# Patient Record
Sex: MALE | Race: BLACK OR AFRICAN AMERICAN | Employment: UNEMPLOYED | URBAN - METROPOLITAN AREA
[De-identification: names, ages, dates, MRNs, and addresses within clinical notes are randomized per-mention and may not be internally consistent; named-entity substitution may affect disease eponyms.]

---

## 2017-01-30 ENCOUNTER — APPOINTMENT (OUTPATIENT)
Dept: GENERAL RADIOLOGY | Facility: HOSPITAL | Age: 37
End: 2017-01-30
Attending: EMERGENCY MEDICINE
Payer: MEDICARE

## 2017-01-30 ENCOUNTER — HOSPITAL ENCOUNTER (EMERGENCY)
Facility: HOSPITAL | Age: 37
Discharge: HOME | End: 2017-01-30
Attending: EMERGENCY MEDICINE
Payer: MEDICARE

## 2017-01-30 VITALS
HEART RATE: 89 BPM | HEIGHT: 73 IN | TEMPERATURE: 98 F | WEIGHT: 280 LBS | SYSTOLIC BLOOD PRESSURE: 162 MMHG | BODY MASS INDEX: 37.11 KG/M2 | DIASTOLIC BLOOD PRESSURE: 81 MMHG | OXYGEN SATURATION: 95 % | RESPIRATION RATE: 22 BRPM

## 2017-01-30 DIAGNOSIS — R05.3 CHRONIC COUGH: ICD-10-CM

## 2017-01-30 DIAGNOSIS — J06.9 UPPER RESPIRATORY TRACT INFECTION, UNSPECIFIED TYPE: Primary | ICD-10-CM

## 2017-01-30 PROCEDURE — 99283 EMERGENCY DEPT VISIT LOW MDM: CPT

## 2017-01-30 PROCEDURE — 71020 XR CHEST PA + LAT CHEST (CPT=71020): CPT

## 2017-01-30 RX ORDER — FLUTICASONE PROPIONATE 50 MCG
1 SPRAY, SUSPENSION (ML) NASAL 2 TIMES DAILY
Qty: 16 G | Refills: 0 | Status: SHIPPED | OUTPATIENT
Start: 2017-01-30 | End: 2017-02-14

## 2017-01-31 NOTE — ED PROVIDER NOTES
Patient Seen in: [facility] AND [facility] Emergency Department    History   Patient presents with:  Cough/URI    Stated Complaint: cold symptoms    HPI    The patient is a 26-year-old male who presents to the emergency department with 1 week of worsening cough ear normal.   Left Ear: Tympanic membrane and external ear normal.   Nose: Mucosal edema and rhinorrhea present. Right sinus exhibits maxillary sinus tenderness. Right sinus exhibits no frontal sinus tenderness.  Left sinus exhibits maxillary sinus tenderne encounter diagnosis)  Chronic cough    Disposition:  Discharge    Follow-up:  Your doctor    Schedule an appointment as soon as possible for a visit in 1 week      Safia Haywood DO  [address] [facility],[address] [place] [address].

## 2025-01-16 ENCOUNTER — APPOINTMENT (OUTPATIENT)
Dept: GENERAL RADIOLOGY | Facility: HOSPITAL | Age: 45
End: 2025-01-16
Attending: EMERGENCY MEDICINE
Payer: MEDICARE

## 2025-01-16 ENCOUNTER — HOSPITAL ENCOUNTER (INPATIENT)
Facility: HOSPITAL | Age: 45
LOS: 1 days | Discharge: LEFT BEFORE DISCHARGE | End: 2025-01-17
Attending: EMERGENCY MEDICINE | Admitting: HOSPITALIST
Payer: MEDICARE

## 2025-01-16 ENCOUNTER — APPOINTMENT (OUTPATIENT)
Dept: CT IMAGING | Facility: HOSPITAL | Age: 45
End: 2025-01-16
Attending: EMERGENCY MEDICINE
Payer: MEDICARE

## 2025-01-16 DIAGNOSIS — J96.01 ACUTE RESPIRATORY FAILURE WITH HYPOXIA (HCC): Primary | ICD-10-CM

## 2025-01-16 DIAGNOSIS — I10 ACCELERATED HYPERTENSION: ICD-10-CM

## 2025-01-16 DIAGNOSIS — R79.89 ELEVATED TROPONIN: ICD-10-CM

## 2025-01-16 PROBLEM — J20.9 ACUTE BRONCHITIS: Status: ACTIVE | Noted: 2025-01-16

## 2025-01-16 LAB
ANION GAP SERPL CALC-SCNC: 6 MMOL/L (ref 0–18)
ATRIAL RATE: 102 BPM
BASOPHILS # BLD AUTO: 0.02 X10(3) UL (ref 0–0.2)
BASOPHILS NFR BLD AUTO: 0.3 %
BUN BLD-MCNC: 10 MG/DL (ref 9–23)
BUN/CREAT SERPL: 9.4 (ref 10–20)
CALCIUM BLD-MCNC: 9.2 MG/DL (ref 8.7–10.4)
CHLORIDE SERPL-SCNC: 105 MMOL/L (ref 98–112)
CHOLEST SERPL-MCNC: 167 MG/DL (ref ?–200)
CO2 SERPL-SCNC: 31 MMOL/L (ref 21–32)
CREAT BLD-MCNC: 1.06 MG/DL
D DIMER PPP FEU-MCNC: 1.25 UG/ML FEU (ref ?–0.5)
DEPRECATED RDW RBC AUTO: 42.6 FL (ref 35.1–46.3)
EGFRCR SERPLBLD CKD-EPI 2021: 88 ML/MIN/1.73M2 (ref 60–?)
EOSINOPHIL # BLD AUTO: 0.04 X10(3) UL (ref 0–0.7)
EOSINOPHIL NFR BLD AUTO: 0.7 %
ERYTHROCYTE [DISTWIDTH] IN BLOOD BY AUTOMATED COUNT: 13.2 % (ref 11–15)
EST. AVERAGE GLUCOSE BLD GHB EST-MCNC: 278 MG/DL (ref 68–126)
FLUAV + FLUBV RNA SPEC NAA+PROBE: NEGATIVE
FLUAV + FLUBV RNA SPEC NAA+PROBE: POSITIVE
GLUCOSE BLD-MCNC: 251 MG/DL (ref 70–99)
GLUCOSE BLDC GLUCOMTR-MCNC: 298 MG/DL (ref 70–99)
GLUCOSE BLDC GLUCOMTR-MCNC: 451 MG/DL (ref 70–99)
HBA1C MFR BLD: 11.3 % (ref ?–5.7)
HCT VFR BLD AUTO: 45.3 %
HDLC SERPL-MCNC: 23 MG/DL (ref 40–59)
HGB BLD-MCNC: 14.9 G/DL
IMM GRANULOCYTES # BLD AUTO: 0.04 X10(3) UL (ref 0–1)
IMM GRANULOCYTES NFR BLD: 0.7 %
LDLC SERPL CALC-MCNC: 114 MG/DL (ref ?–100)
LYMPHOCYTES # BLD AUTO: 2.06 X10(3) UL (ref 1–4)
LYMPHOCYTES NFR BLD AUTO: 33.6 %
MCH RBC QN AUTO: 29 PG (ref 26–34)
MCHC RBC AUTO-ENTMCNC: 32.9 G/DL (ref 31–37)
MCV RBC AUTO: 88.1 FL
MONOCYTES # BLD AUTO: 0.54 X10(3) UL (ref 0.1–1)
MONOCYTES NFR BLD AUTO: 8.8 %
NEUTROPHILS # BLD AUTO: 3.44 X10 (3) UL (ref 1.5–7.7)
NEUTROPHILS # BLD AUTO: 3.44 X10(3) UL (ref 1.5–7.7)
NEUTROPHILS NFR BLD AUTO: 55.9 %
NONHDLC SERPL-MCNC: 144 MG/DL (ref ?–130)
NT-PROBNP SERPL-MCNC: 427 PG/ML (ref ?–125)
OSMOLALITY SERPL CALC.SUM OF ELEC: 302 MOSM/KG (ref 275–295)
P AXIS: 59 DEGREES
P-R INTERVAL: 168 MS
PLATELET # BLD AUTO: 190 10(3)UL (ref 150–450)
POTASSIUM SERPL-SCNC: 4.3 MMOL/L (ref 3.5–5.1)
Q-T INTERVAL: 364 MS
QRS DURATION: 92 MS
QTC CALCULATION (BEZET): 474 MS
R AXIS: 33 DEGREES
RBC # BLD AUTO: 5.14 X10(6)UL
RSV RNA SPEC NAA+PROBE: NEGATIVE
SARS-COV-2 RNA RESP QL NAA+PROBE: NOT DETECTED
SODIUM SERPL-SCNC: 142 MMOL/L (ref 136–145)
T AXIS: 52 DEGREES
TRIGL SERPL-MCNC: 170 MG/DL (ref 30–149)
TROPONIN I SERPL HS-MCNC: 72 NG/L
TROPONIN I SERPL HS-MCNC: 76 NG/L
VENTRICULAR RATE: 102 BPM
VLDLC SERPL CALC-MCNC: 30 MG/DL (ref 0–30)
WBC # BLD AUTO: 6.1 X10(3) UL (ref 4–11)

## 2025-01-16 PROCEDURE — 71045 X-RAY EXAM CHEST 1 VIEW: CPT | Performed by: EMERGENCY MEDICINE

## 2025-01-16 PROCEDURE — 5A0945A ASSISTANCE WITH RESPIRATORY VENTILATION, 24-96 CONSECUTIVE HOURS, HIGH NASAL FLOW/VELOCITY: ICD-10-PCS | Performed by: FAMILY MEDICINE

## 2025-01-16 PROCEDURE — 99223 1ST HOSP IP/OBS HIGH 75: CPT | Performed by: HOSPITALIST

## 2025-01-16 PROCEDURE — 71260 CT THORAX DX C+: CPT | Performed by: EMERGENCY MEDICINE

## 2025-01-16 PROCEDURE — 99223 1ST HOSP IP/OBS HIGH 75: CPT | Performed by: INTERNAL MEDICINE

## 2025-01-16 RX ORDER — INSULIN DEGLUDEC 100 U/ML
15 INJECTION, SOLUTION SUBCUTANEOUS NIGHTLY
Status: DISCONTINUED | OUTPATIENT
Start: 2025-01-16 | End: 2025-01-17

## 2025-01-16 RX ORDER — METHYLPREDNISOLONE SODIUM SUCCINATE 125 MG/2ML
125 INJECTION INTRAMUSCULAR; INTRAVENOUS ONCE
Status: COMPLETED | OUTPATIENT
Start: 2025-01-16 | End: 2025-01-16

## 2025-01-16 RX ORDER — METOPROLOL TARTRATE 1 MG/ML
2.5 INJECTION, SOLUTION INTRAVENOUS ONCE
Status: COMPLETED | OUTPATIENT
Start: 2025-01-16 | End: 2025-01-16

## 2025-01-16 RX ORDER — HYDRALAZINE HYDROCHLORIDE 20 MG/ML
10 INJECTION INTRAMUSCULAR; INTRAVENOUS ONCE
Status: COMPLETED | OUTPATIENT
Start: 2025-01-16 | End: 2025-01-16

## 2025-01-16 RX ORDER — IPRATROPIUM BROMIDE AND ALBUTEROL SULFATE 2.5; .5 MG/3ML; MG/3ML
3 SOLUTION RESPIRATORY (INHALATION)
Status: DISCONTINUED | OUTPATIENT
Start: 2025-01-16 | End: 2025-01-17

## 2025-01-16 RX ORDER — OSELTAMIVIR PHOSPHATE 75 MG/1
75 CAPSULE ORAL 2 TIMES DAILY
Status: DISCONTINUED | OUTPATIENT
Start: 2025-01-16 | End: 2025-01-17

## 2025-01-16 RX ORDER — DILTIAZEM HCL 60 MG
60 TABLET ORAL EVERY 6 HOURS SCHEDULED
Status: DISCONTINUED | OUTPATIENT
Start: 2025-01-16 | End: 2025-01-17

## 2025-01-16 RX ORDER — PROCHLORPERAZINE EDISYLATE 5 MG/ML
5 INJECTION INTRAMUSCULAR; INTRAVENOUS EVERY 8 HOURS PRN
Status: DISCONTINUED | OUTPATIENT
Start: 2025-01-16 | End: 2025-01-17

## 2025-01-16 RX ORDER — ALBUTEROL SULFATE 0.83 MG/ML
2.5 SOLUTION RESPIRATORY (INHALATION) ONCE
Status: COMPLETED | OUTPATIENT
Start: 2025-01-16 | End: 2025-01-16

## 2025-01-16 RX ORDER — TEMAZEPAM 15 MG/1
15 CAPSULE ORAL NIGHTLY PRN
Status: DISCONTINUED | OUTPATIENT
Start: 2025-01-16 | End: 2025-01-17

## 2025-01-16 RX ORDER — ACETAMINOPHEN 500 MG
500 TABLET ORAL EVERY 4 HOURS PRN
Status: DISCONTINUED | OUTPATIENT
Start: 2025-01-16 | End: 2025-01-17

## 2025-01-16 RX ORDER — IPRATROPIUM BROMIDE AND ALBUTEROL SULFATE 2.5; .5 MG/3ML; MG/3ML
3 SOLUTION RESPIRATORY (INHALATION) ONCE
Status: COMPLETED | OUTPATIENT
Start: 2025-01-16 | End: 2025-01-16

## 2025-01-16 RX ORDER — NICOTINE POLACRILEX 4 MG
15 LOZENGE BUCCAL
Status: DISCONTINUED | OUTPATIENT
Start: 2025-01-16 | End: 2025-01-17

## 2025-01-16 RX ORDER — ASPIRIN 325 MG
325 TABLET ORAL DAILY
Status: DISCONTINUED | OUTPATIENT
Start: 2025-01-16 | End: 2025-01-17

## 2025-01-16 RX ORDER — METHYLPREDNISOLONE SODIUM SUCCINATE 40 MG/ML
40 INJECTION INTRAMUSCULAR; INTRAVENOUS EVERY 12 HOURS
Status: DISCONTINUED | OUTPATIENT
Start: 2025-01-16 | End: 2025-01-17

## 2025-01-16 RX ORDER — NICOTINE POLACRILEX 4 MG
30 LOZENGE BUCCAL
Status: DISCONTINUED | OUTPATIENT
Start: 2025-01-16 | End: 2025-01-17

## 2025-01-16 RX ORDER — HYDRALAZINE HYDROCHLORIDE 20 MG/ML
20 INJECTION INTRAMUSCULAR; INTRAVENOUS EVERY 4 HOURS PRN
Status: DISCONTINUED | OUTPATIENT
Start: 2025-01-16 | End: 2025-01-17

## 2025-01-16 RX ORDER — NITROGLYCERIN 0.4 MG/1
0.4 TABLET SUBLINGUAL EVERY 5 MIN PRN
Status: DISCONTINUED | OUTPATIENT
Start: 2025-01-16 | End: 2025-01-17

## 2025-01-16 RX ORDER — BENZONATATE 100 MG/1
200 CAPSULE ORAL 3 TIMES DAILY PRN
Status: DISCONTINUED | OUTPATIENT
Start: 2025-01-16 | End: 2025-01-17

## 2025-01-16 RX ORDER — HYDRALAZINE HYDROCHLORIDE 20 MG/ML
10 INJECTION INTRAMUSCULAR; INTRAVENOUS
Status: DISCONTINUED | OUTPATIENT
Start: 2025-01-16 | End: 2025-01-16

## 2025-01-16 RX ORDER — FLUTICASONE PROPIONATE AND SALMETEROL 250; 50 UG/1; UG/1
1 POWDER RESPIRATORY (INHALATION) 2 TIMES DAILY
Status: DISCONTINUED | OUTPATIENT
Start: 2025-01-16 | End: 2025-01-17

## 2025-01-16 RX ORDER — IPRATROPIUM BROMIDE AND ALBUTEROL SULFATE 2.5; .5 MG/3ML; MG/3ML
3 SOLUTION RESPIRATORY (INHALATION) EVERY 4 HOURS PRN
Status: DISCONTINUED | OUTPATIENT
Start: 2025-01-16 | End: 2025-01-17

## 2025-01-16 RX ORDER — AMLODIPINE BESYLATE 10 MG/1
10 TABLET ORAL DAILY
Status: DISCONTINUED | OUTPATIENT
Start: 2025-01-16 | End: 2025-01-16

## 2025-01-16 RX ORDER — DEXTROSE MONOHYDRATE 25 G/50ML
50 INJECTION, SOLUTION INTRAVENOUS
Status: DISCONTINUED | OUTPATIENT
Start: 2025-01-16 | End: 2025-01-17

## 2025-01-16 RX ORDER — ENOXAPARIN SODIUM 100 MG/ML
40 INJECTION SUBCUTANEOUS DAILY
Status: DISCONTINUED | OUTPATIENT
Start: 2025-01-16 | End: 2025-01-17

## 2025-01-16 RX ORDER — ONDANSETRON 2 MG/ML
4 INJECTION INTRAMUSCULAR; INTRAVENOUS EVERY 6 HOURS PRN
Status: DISCONTINUED | OUTPATIENT
Start: 2025-01-16 | End: 2025-01-17

## 2025-01-16 NOTE — ED QUICK NOTES
Orders for admission, patient is aware of plan and ready to go upstairs. Any questions, please call ED RN bianca at extension [phone].     Patient Covid vaccination status: Unvaccinated     COVID Test Ordered in ED: SARS-CoV-2/Flu A and B/RSV by PCR (GeneXpert)    COVID Suspicion at Admission: N/A    Running Infusions:  None    Mental Status/LOC at time of transport: x4    Other pertinent information:   CIWA score: N/A   NIH score:  N/A

## 2025-01-16 NOTE — CONSULTS
Initial Pulmonary Medicine Inpatient Consultation           Consult requested by Dr. Elizondo for shortness of breath.        HPI: 43 yo male with hx of HTN (used to take meds in the past but not recently), previously smoked weed , admitted with shortness of breath, coughing, wheezing x 1 week. No n/v/d.  Possible sick contacts  In the ED found to be hypoxemic  Received neb tx, steroids  D dimer + so had a chest CT PE done showed pulm edema pattern  Tested influenza A +  proBNP 400    Afebrile, on 3 LNC. Feels a little better       REVIEW OF SYSTEMS:  Positives and negatives as stated in HPI. Remainder of 12 pt review of systems otherwise are negative.       PAST MEDICAL HISTORY:  Past Medical History:   Diagnosis Date    Unspecified essential hypertension         PAST SURGICAL HISTORY:  History reviewed. No pertinent surgical history.     PAST FAMILY HISTORY:  No family history on file.     PAST SOCIAL HISTORY:  Social History     Socioeconomic History    Marital status: Single   Tobacco Use    Smoking status: Never   Substance and Sexual Activity    Alcohol use: No     Alcohol/week: 0.0 standard drinks of alcohol    Drug use: No    Sexual activity: Yes     Partners: Female   States he quit smoking 2 weeks ago. Before that has been smoking since age 16 @ 4-5 bag weed  Doesn't smoke cig  Lives with room mate  No pets  Employed: security        ALLERGIES:  Allergies[1]     MEDS:  Home Medications:  Medications Taking[2]  Scheduled Medication:    Continuous Infusing Medication:    PRN Medications:         PHYSICAL EXAM:  /85   Pulse 90   Temp 97.8 °F (36.6 °C) (Temporal)   Resp 24   SpO2 90%   CONSTITUTIONAL: alert, oriented, no apparent distress  HEENT: atraumatic normocephalic  MOUTH: mucous membranes are moist. No OP exudates  NECK/THROAT: no JVD. Trachea midline. No obvious thyromegaly  LUNG: clear b/l no wheezing, crackles. Chest symmetric with respiratory motion  HEART: regular rate and rhythm, no  obvious murmers or gallops note  ABD: soft non tender. + bowel sounds. No organomegaly noted  EXT: no clubbing, cyanosis, or edema noted. Pulses intact grossly  NEURO/MUSCULOSKELETAL: no gross deficits  SKIN: warm, dry. No obvious lesions noted  LYMPH: no obvious LAD       IMAGES:  Chest CT PE my read no PE, b/l ground glass opacities concerning for pulm edema  Official read:  No pulmonary embolus   Diffuse septal thickening with multiple ill-defined areas of ground-glass within the bilateral upper and lower lobe suggestive of moderate pulmonary edema.  Superimposed multifocal pneumonia felt less likely.   Hepatic steatosis       LABS:  Recent Labs   Lab 01/16/25  1357   RBC 5.14   HGB 14.9   HCT 45.3   MCV 88.1   MCH 29.0   MCHC 32.9   RDW 13.2   NEPRELIM 3.44   WBC 6.1   .0       Recent Labs   Lab 01/16/25  1357   *   BUN 10   CREATSERUM 1.06   EGFRCR 88   CA 9.2      K 4.3      CO2 31.0          ASSESSMENT/PLAN:  Acute hypoxemic respiratory failure  -likely d/t influenza A viral infection/bronchitis  -start tamiflu course  -start duonebs q 6 hrs and  advair 9250/50) bid  -can start short course of abx  -hold off on further steroids since pt is not wheezing now    HTN  -not on meds any longer  -defer to primary team to start meds  -may benefit from diuresis with chest CT showing pulm edema and labs showing elevated proBNP    Smokes weed  -stopped 2 weeks ago    Snoring and elevated BMI  -probably has AMANDEEP  -will need outpt PSG    Proph:  -DVT: hep or lovenox-defer to primary team       Thank you for the opportunity to care for Renny Shellie Banegas DO, MPH  Pulmonary Critical Care Medicine  [facility]          [1] No Known Allergies  [2]   No outpatient medications have been marked as taking for the 1/16/25 encounter (Hospital Encounter).

## 2025-01-16 NOTE — ED INITIAL ASSESSMENT (HPI)
Pt to ED via [facility] with shortness of breath with exertion x1 week.  Per EMS, IC reported that the pt was 85% on RA before albuterol treatment.   Denies any chest pain, cough, or recent fevers.   Pt O2 sat at 86% on RA upon arrival to ER.

## 2025-01-16 NOTE — H&P
[facility]    PATIENT'S NAME: JOSE NAVAS   ATTENDING PHYSICIAN: Christiano Elizondo MD   PATIENT ACCOUNT#:   [id number]    LOCATION:  [address]  MEDICAL RECORD #:   [id number]       YOB: 1980  ADMISSION DATE:       01/16/2025    HISTORY AND PHYSICAL EXAMINATION    CHIEF COMPLAINT:  Acute bronchitis, hypoxia, elevated troponins.     HISTORY OF PRESENT ILLNESS:  Patient is a 44-year-old  male who came into the emergency department for evaluation today for progressive wheezing, shortness of breath, fatigue, and dyspnea on exertion.  CBC and chemistry were unremarkable except for glucose 251.  Blood pressure was elevated initially at 166/111.  Troponin 72.  EKG showed left ventricular hypertrophy changes.  Patient also noted to be hypoxic, 86% on room air, requiring oxygen, and wheezing.  D-dimer was slightly elevated t this time 1.25 and proBNP 427.  Chest x-ray showed vague bibasilar opacities.  CT scan of the chest to rule out PE is still pending.  GeneXpert viral panel is still pending.    PAST MEDICAL HISTORY:  Morbid obesity, possible undiagnosed obstructive sleep apnea, and possible undiagnosed diabetes mellitus type 2.     PAST SURGICAL HISTORY:  None.    MEDICATIONS:  Patient does not take medications at home.     ALLERGIES:  No known drug allergies.    FAMILY HISTORY:  Positive for diabetes mellitus type 2 and hypertension.    SOCIAL HISTORY:  He quit tobacco around 2 weeks ago, used to be a heavy smoker.  Occasional alcohol.  No drug use.  Lives by himself.  Usually independent in his basic activities of daily living.    REVIEW OF SYSTEMS:  Patient describes wheezing and shortness of breath, started with cough around a week ago, now he is very fatigued and tired and dyspneic, even without exertion.  No chest pain.  Other 12-point review of systems is negative.        PHYSICAL EXAMINATION:    GENERAL:  Alert, oriented to time, place, and person.  Visibly  dyspneic.   VITAL SIGNS:  Temperature 97.8, pulse 99, respiratory rate 24, blood pressure 160/79 after IV hydralazine, pulse ox 86% on room air, 97% on 3L nasal cannula oxygen.   HEENT:  Atraumatic.  Oropharynx clear.  Moist mucous membranes.  Ears, nose normal.  Eyes:  Anicteric sclerae.    NECK:  Supple.  No lymphadenopathy.  Trachea midline.  Full range of motion.    LUNGS:  Bilateral expiratory wheezing both lung fields.   HEART:  Regular rate and rhythm.  S1, S2 auscultated.    ABDOMEN:  Soft, nondistended.  Obese.  Positive bowel sounds.    EXTREMITIES:  +1 edema of both legs.  No clubbing or cyanosis.    NEUROLOGIC:  Motor and sensory intact.    ASSESSMENT:    1.   Acute bronchitis, most likely viral, with hypoxemic respiratory failure.   2.   Morbid obesity, possibly undiagnosed severe overlap syndrome and obstructive sleep apnea.  3.   Elevated troponin, possible demand ischemia, with underlying left ventricular hypertrophy with hypertensive cardiac disease.  4.   Uncontrolled hypertension.  5.   Hyperglycemia, possible undiagnosed diabetes mellitus type 2.    PLAN:  Patient will be admitted to telemetry floor.  Continue to trend troponin level.  Obtain 2D echocardiogram with Doppler.  Start him on Norvasc 10 mg daily.  IV hydralazine p.r.n.  Monitor Accu-Cheks and start on long- and short-acting insulin with IV Solu-Medrol and nebulizer treatments.  Oxygen support.  Pulmonary and cardiology consults.  Further recommendations to follow.    Dictated By Nia Matthews MD  d: 01/16/2025 15:18:29  t: 01/16/2025 16:42:35  Job [id number]  FB/

## 2025-01-16 NOTE — ED PROVIDER NOTES
Patient Seen in: [facility] Emergency Department      History     Chief Complaint   Patient presents with    Shortness Of Breath     Stated Complaint:     Subjective:   HPI      43 y/o male from  with difficulty breathing over the past week.  Hypoxic at immediate care.  Did get a little improvement with nebs.  No history of asthma or heart history.  No recent travel or antibiotics.  Does not have a doctor currently.  No pain.    Objective:     Past Medical History:    Unspecified essential hypertension              History reviewed. No pertinent surgical history.             Social History     Socioeconomic History    Marital status: Single   Tobacco Use    Smoking status: Never   Substance and Sexual Activity    Alcohol use: No     Alcohol/week: 0.0 standard drinks of alcohol    Drug use: No    Sexual activity: Yes     Partners: Female                  Physical Exam     ED Triage Vitals   BP 01/16/25 1306 (!) 166/111   Pulse 01/16/25 1306 104   Resp 01/16/25 1306 22   Temp 01/16/25 1307 97.8 °F (36.6 °C)   Temp src 01/16/25 1307 Temporal   SpO2 01/16/25 1306 (!) 86 %   O2 Device 01/16/25 1306 None (Room air)       Current Vitals:   Vital Signs  BP: 150/85  Pulse: 90  Resp: 24  Temp: 97.8 °F (36.6 °C)  Temp src: Temporal  MAP (mmHg): 99    Oxygen Therapy  SpO2: 90 %  O2 Device: Nasal cannula  O2 Flow Rate (L/min): 3 L/min        Physical Exam  Constitutional: Oriented to person, place, and time.  Appears well-developed. No distress.  Obese  Head: Normocephalic and atraumatic.   Eyes: Conjunctivae are normal. Pupils are equal, round, and reactive to light.   Neck: Normal range of motion. Neck supple.  No stridor or JVD  Cardiovascular: Normal rate, regular rhythm and intact distal pulses.    Pulmonary/Chest: No distress, diminished lung sounds with faint wheeze bilaterally, no crackles  Abdominal: Soft. There is no tenderness. There is no guarding.   Musculoskeletal: Normal range of motion. No edema or  tenderness.   Neurological: Alert and oriented to person, place, and time.   Skin: Skin is warm and dry.   Psychiatric: Normal mood and affect.  Behavior is normal.   Nursing note and vitals reviewed.    Differential diagnosis includes acute viral respiratory syndrome, viral bronchitis, pneumonia, accelerated hypertension, PE.      ED Course     Labs Reviewed   TROPONIN I HIGH SENSITIVITY - Abnormal; Notable for the following components:       Result Value    Troponin I (High Sensitivity) 72 (*)     All other components within normal limits   BASIC METABOLIC PANEL (8) - Abnormal; Notable for the following components:    Glucose 251 (*)     BUN/CREA Ratio 9.4 (*)     Calculated Osmolality 302 (*)     All other components within normal limits   D-DIMER - Abnormal; Notable for the following components:    D-Dimer 1.25 (*)     All other components within normal limits   LIPID PANEL - Abnormal; Notable for the following components:    HDL Cholesterol 23 (*)     Triglycerides 170 (*)     LDL Cholesterol 114 (*)     Non HDL Chol 144 (*)     All other components within normal limits   PRO BETA NATRIURETIC PEPTIDE - Abnormal; Notable for the following components:    Pro-Beta Natriuretic Peptide 427 (*)     All other components within normal limits   TROPONIN I HIGH SENSITIVITY - Abnormal; Notable for the following components:    Troponin I (High Sensitivity) 76 (*)     All other components within normal limits   CBC WITH DIFFERENTIAL WITH PLATELET   RAINBOW DRAW LAVENDER   RAINBOW DRAW LIGHT GREEN   RAINBOW DRAW BLUE   SARS-COV-2/FLU A AND B/RSV BY PCR (GENEXPERT)     EKG    Rate, intervals and axes as noted on EKG Report.  Rate: 102  Rhythm: Sinus Rhythm  Reading: No acute ischemic changes                CT CHEST PE AORTA (IV ONLY) (CPT=71260)    Result Date: 1/16/2025  PROCEDURE: CT CHEST PE AORTA (IV ONLY) (CPT=71260)  COMPARISON: [facility], XR CHEST AP PORTABLE (CPT=71045), 1/16/2025, 2:26 PM.  [place]  [facility], CT CHEST PAIN/PE W CONTRAST, 9/12/2015, 3:14 PM.  INDICATIONS: Hypoxia, tachycardia, and elevated d-dimer.  TECHNIQUE: CT images of the chest were obtained with non-ionic intravenous contrast material.  Automated exposure control for dose reduction was used. Adjustment of the mA and/or kV was done based on the patient's size. Use of iterative reconstruction technique for dose reduction was used. Dose information is transmitted to the ACR (American College of Radiology) NRDR (National Radiology Data Registry) which includes the Dose Index Registry.  FINDINGS:  PULMONARY ARTERIES:   No pulmonary embolus   LINES AND TUBES: None.  MEDIASTINUM/VASCULATURE: No axillary, hilar, mediastinal lymphadenopathy. The thoracic aorta is unremarkable and not dilated. Mediastinal fat planes are preserved. Cardiac chambers are unremarkable. Pericardium is normal. The main pulmonary artery has a normal diameter and is otherwise unremarkable.  LUNGS AND PLEURA: Multiple patchy areas of ground-glass seen scattered throughout the bilateral upper and lower lobes.  Mild interlobular septal thickening within the bilateral upper and lower lobes.  No pneumothorax or pleural effusion.  CHEST WALL/BONES: There is degenerative disease of the thoracic spine. The chest wall and osseous structures are otherwise unremarkable.  LIMITED ABDOMEN: Small hiatal hernia with wall thickening at the gastroesophageal junction. Diffuse low attenuation seen throughout the liver compatible with fatty infiltration.         CONCLUSION:   No pulmonary embolus  Diffuse septal thickening with multiple ill-defined areas of ground-glass within the bilateral upper and lower lobe suggestive of moderate pulmonary edema.  Superimposed multifocal pneumonia felt less likely.  Hepatic steatosis    Dictated by (CST): Teo Elizabeth MD on 1/16/2025 at 4:25 PM     Finalized by (CST): Teo Elizabeth MD on 1/16/2025 at 4:30 PM          XR CHEST AP PORTABLE   (CPT=71045)    Result Date: 1/16/2025  PROCEDURE: XR CHEST AP PORTABLE  (CPT=71045) TIME: 1426  COMPARISON: None.  INDICATIONS: Shortness of breath and cough x1 week.  TECHNIQUE:   Single view.   Findings and impression:  Heart and vascularity are magnified by portable technique and body habitus  Normal lung volumes.  No consolidation  There are mild vague bibasilar opacities, nonspecific  No pneumothorax Finalized by (CST): Ant Verde MD on 1/16/2025 at 2:49 PM                [facility]          Admission disposition: 1/16/2025  3:47 PM           Medical Decision Making  Patient is stable.  Heart rate and blood pressure little improved.  Does have short runs of PSVT.  I did give him a dose of Lopressor.  He got some nebs and steroids.  Troponin leak.  Cardiology and pulmonary to see.  Hospitalist will admit.    Problems Addressed:  Accelerated hypertension: chronic illness or injury with exacerbation, progression, or side effects of treatment  Acute respiratory failure with hypoxia (HCC): acute illness or injury with systemic symptoms that poses a threat to life or bodily functions  Elevated troponin: undiagnosed new problem with uncertain prognosis    Amount and/or Complexity of Data Reviewed  Labs: ordered. Decision-making details documented in ED Course.  Radiology: ordered and independent interpretation performed. Decision-making details documented in ED Course.     Details: By my review there is no obvious evidence of pulmonary edema, pleural effusion, pneumothorax or focal infiltrate on x-ray imaging.  ECG/medicine tests: ordered and independent interpretation performed. Decision-making details documented in ED Course.    Risk  Decision regarding hospitalization.        Disposition and Plan     Clinical Impression:  1. Acute respiratory failure with hypoxia (HCC)    2. Accelerated hypertension    3. Elevated troponin         Disposition:  Admit  1/16/2025  3:47 pm    Follow-up:  No follow-up provider  specified.        Medications Prescribed:  There are no discharge medications for this patient.          Supplementary Documentation:         Hospital Problems       Present on Admission  Date Reviewed: 9/21/2015            ICD-10-CM Noted POA    * (Principal) Acute respiratory failure with hypoxia (HCC) J96.01 1/16/2025 Unknown

## 2025-01-16 NOTE — CONSULTS
[place] [facility]  Report of Consultation    Renny Freitas Patient Status:  Emergency    1980 MRN [id number]   Location [facility] EMERGENCY DEPARTMENT Attending Christiano Elizondo MD   Hosp Day # 0 PCP None Pcp     Reason for Consultation:  Supraventricular tachycardia  Dyspnea on exertion with hypoxia  Hypertension  Elevated troponin level  Elevated proBNP.    History of Present Illness:  Renny Freitas is a a(n) 44 year old male, works for security, presented with worsening shortness of breath on exertion over the last 10 days, wheezes, generalized weakness.  He was hypoxic in ER in mid 80s percent on room air.  He received bronchodilators in ER and started on oxygen.    He stopped smoking a week ago.    Blood pressure high 160s / 100s mmHg in ER, due to respiratory distress.    The patient does not take any medications on regular basis.    Hypertension and dyslipidemia.    Viral PCR in ER positive for influenza A    Denies cardiac history or cardiac procedures.  He denies prior asthma or COPD.  No recent travel.    Important labs: Glucose 151 potassium 4.3 sodium 142 creatinine 1.06 and abnormal cholesterol, nonfasting total cholesterol 167 triglycerides 170 HDL 23 .    In ER, troponin was elevated at 72 and proBNP elevated at 427.  D-dimer elevated 1.25.    Telemetry: Sinus with paroxysmal brief SVT.    Chest x-ray with interstitial changes but no obvious congestion or pleural effusion.    Chest CT -no PE but diffuse septal thickening with multiple ill-defined areas of ground-glass within the bilateral upper and lower lobe -multifocal infectious process with the virus.  Fatty liver.    EKG: Tachycardia 102 bpm with PACs, no ischemia, normal intervals and normal axis.    Echo: 2015 -per [facility] cardiology report - LVEF 55% with mild to moderate LVH, concentric hypertrophy but no wall motion abnormalities.  Mildly enlarged left atrium.  No valvular pathology and no pulm  hypertension.      History:  Past Medical History:    Unspecified essential hypertension     Surgical history: No major surgeries.    Family history negative for early CAD.    Social History:  No abuse.  Intermittent smoker    Allergies:  Allergies[1]    Medications:  Patient is not taking prescribed medications at home.    Review of Systems:     Constitutional: Weakness.  Eyes: Patient denies significant visual changes.  Ears, Nose, Mouth, Throat:  Negative for any new hearing loss. No sore throat. No nasal congestion.  Cardiovascular: see HPI -no prior cardiac history.  Respiratory: Dyspnea and wheezes.  Hematologic/Lymphatic: No easy bruising or bleeding.   Integumentary: No rash or suspicious lesions on the skin.   Musculoskeletal: No arthritis or myalgias.  Gastrointestinal: Negative for any bleeding. No abdominal pain. No diarrhea.  Genitourinary: No hematuria.   Neurological: Alert and oriented, no headaches, no focal weakness or new paresthesias.  Allergic/Immunologic: no rhinitis or environmental allergies      Physical Exam:  Blood pressure 150/85, pulse 90, temperature 97.8 °F (36.6 °C), temperature source Temporal, resp. rate 24, SpO2 90%.  Temp (24hrs), Av.8 °F (36.6 °C), Min:97.8 °F (36.6 °C), Max:97.8 °F (36.6 °C)    Wt Readings from Last 3 Encounters:   17 280 lb (127 kg)   09/21/15 300 lb (136.1 kg)       Constitutional: Normal appearance. No apparent distress.  Eyes: Moist conjunctivae, PERRLA.  Ears, Nose, Mouth, Throat:  Moist mucosa. Anicteric sclera. Oropharynx clear.  Head and Neck: No JVD, carotids 2+ no bruits. Neck supple. No thyromegaly or adenopathy. Normocephalic head.  Cardiovascular: Borderline tachycardia regular rate and rhythm, S1, S2 normal, no pathologic murmur, rub or gallop.  Respiratory: Decreased air entry with few basal crackles.  Wheezes  Gastrointestinal: Soft, non-tender abdomen.   Extremities: Without clubbing, cyanosis or edema.  Peripheral pulses are  2+.  Neurologic: Alert and oriented x3.  Cranial nerves intact. Normal sensation and motor function. No focal deficits.  Musculoskeletal: Generalized weakness.  Integumentary: Warm and dry skin. No rashes.  Psychiatric: no depression. Normal affect.     Laboratories and Data:    Imaging:  CT CHEST PE AORTA (IV ONLY) (CPT=71260)    Result Date: 1/16/2025  PROCEDURE: CT CHEST PE AORTA (IV ONLY) (CPT=71260)          CONCLUSION:   No pulmonary embolus  Diffuse septal thickening with multiple ill-defined areas of ground-glass within the bilateral upper and lower lobe suggestive of moderate pulmonary edema.  Superimposed multifocal pneumonia felt less likely.  Hepatic steatosis        XR CHEST AP PORTABLE  (CPT=71045)    Result Date: 1/16/2025  PROCEDURE: XR CHEST AP PORTABLE  (CPT=71045) TIME: 1426  COMPARISON: None.  INDICATIONS: Shortness of breath and cough x1 week.  TECHNIQUE:   Single view.   Findings and impression:  Heart and vascularity are magnified by portable technique and body habitus  Normal lung volumes.  No consolidation  There are mild vague bibasilar opacities, nonspecific  No pneumothorax Finalized by (CST):         Labs:     No results found for: \"PT\", \"INR\"  Lab Results   Component Value Date     01/16/2025    HDL 23 01/16/2025    TRIG 170 01/16/2025    VLDL 30 01/16/2025     Lab Results   Component Value Date    WBC 6.1 01/16/2025    HGB 14.9 01/16/2025    HCT 45.3 01/16/2025    .0 01/16/2025    CREATSERUM 1.06 01/16/2025    BUN 10 01/16/2025     01/16/2025    K 4.3 01/16/2025     01/16/2025    CO2 31.0 01/16/2025     01/16/2025    CA 9.2 01/16/2025    DDIMER 1.25 01/16/2025     Impression:  Acute hypoxic respiratory failure.  Viral syndrome with influenza A upper respiratory tract infection.  PCR positive for influenza A.  SVT, brief episodes, triggered by viral infection and nebulized treatment  Hypertension high BP with respiratory distress 160s / 110s mmHg  near  Mild reactive tachycardia.  Elevated troponin level, mildly with viral syndrome rather than primary coronary event.  Elevated proBNP with infection rather than CHF  Elevated D-dimer with negative CT for PE -inflammatory.  Dyslipidemia.  With obesity with weight 280 pounds  Suspected obstructive sleep apnea.  Hyperglycemia.    Plan:   -Supportive treatment of viral syndrome with influenza A upper respiratory infection  -Bronchodilators/nebs  -IV steroids  -Start short acting Cardizem 60 mg p.o. every 6 hours for paroxysmal SVT in view of viral infection and labs and also to help with high blood pressure  -Avoid beta-blocker with acute bronchitis manage other use Cardizem for arrhythmia  -As needed IV hydralazine on top of oral Cardizem  -Echo Doppler oxygen to correct hypoxia  -No return to smoking  -Sleep apnea study when recovers from acute illness  -Workup is not urgent can be done later when recovers from acute phase of illness and mild elevation in troponin is most likely related to viral syndrome  -Trend troponins  -Check A1c with hyperglycemia and obesity  -Tamiflu  -Antibiotic per pulmonary  -Patient received 1 L of IV fluid bolus, DC IV fluids to avoid iatrogenic fluid overload, elevated proBNP in ER but more from viral syndrome than CHF    Discussed with the patient and the nursing staff  Thank you for consult    Jama Monte M.D., F.A.C.C.  [facility]    1/16/2025  5:00 PM  C5         [1] No Known Allergies

## 2025-01-17 ENCOUNTER — APPOINTMENT (OUTPATIENT)
Dept: CV DIAGNOSTICS | Facility: HOSPITAL | Age: 45
End: 2025-01-17
Attending: HOSPITALIST
Payer: MEDICARE

## 2025-01-17 VITALS
BODY MASS INDEX: 42.66 KG/M2 | DIASTOLIC BLOOD PRESSURE: 77 MMHG | SYSTOLIC BLOOD PRESSURE: 145 MMHG | HEART RATE: 98 BPM | RESPIRATION RATE: 20 BRPM | TEMPERATURE: 99 F | HEIGHT: 72 IN | OXYGEN SATURATION: 93 % | WEIGHT: 315 LBS

## 2025-01-17 LAB
ANION GAP SERPL CALC-SCNC: 6 MMOL/L (ref 0–18)
BASOPHILS # BLD AUTO: 0.01 X10(3) UL (ref 0–0.2)
BASOPHILS NFR BLD AUTO: 0.2 %
BUN BLD-MCNC: 13 MG/DL (ref 9–23)
BUN/CREAT SERPL: 13.3 (ref 10–20)
CALCIUM BLD-MCNC: 9.3 MG/DL (ref 8.7–10.4)
CHLORIDE SERPL-SCNC: 105 MMOL/L (ref 98–112)
CO2 SERPL-SCNC: 30 MMOL/L (ref 21–32)
CREAT BLD-MCNC: 0.98 MG/DL
DEPRECATED RDW RBC AUTO: 42.5 FL (ref 35.1–46.3)
EGFRCR SERPLBLD CKD-EPI 2021: 97 ML/MIN/1.73M2 (ref 60–?)
EOSINOPHIL # BLD AUTO: 0 X10(3) UL (ref 0–0.7)
EOSINOPHIL NFR BLD AUTO: 0 %
ERYTHROCYTE [DISTWIDTH] IN BLOOD BY AUTOMATED COUNT: 13.2 % (ref 11–15)
EST. AVERAGE GLUCOSE BLD GHB EST-MCNC: 278 MG/DL (ref 68–126)
GLUCOSE BLD-MCNC: 310 MG/DL (ref 70–99)
GLUCOSE BLDC GLUCOMTR-MCNC: 295 MG/DL (ref 70–99)
GLUCOSE BLDC GLUCOMTR-MCNC: 323 MG/DL (ref 70–99)
GLUCOSE BLDC GLUCOMTR-MCNC: 335 MG/DL (ref 70–99)
HBA1C MFR BLD: 11.3 % (ref ?–5.7)
HCT VFR BLD AUTO: 47.6 %
HGB BLD-MCNC: 15.5 G/DL
IMM GRANULOCYTES # BLD AUTO: 0.04 X10(3) UL (ref 0–1)
IMM GRANULOCYTES NFR BLD: 0.6 %
LYMPHOCYTES # BLD AUTO: 1.08 X10(3) UL (ref 1–4)
LYMPHOCYTES NFR BLD AUTO: 17 %
MCH RBC QN AUTO: 28.5 PG (ref 26–34)
MCHC RBC AUTO-ENTMCNC: 32.6 G/DL (ref 31–37)
MCV RBC AUTO: 87.5 FL
MONOCYTES # BLD AUTO: 0.31 X10(3) UL (ref 0.1–1)
MONOCYTES NFR BLD AUTO: 4.9 %
NEUTROPHILS # BLD AUTO: 4.91 X10 (3) UL (ref 1.5–7.7)
NEUTROPHILS # BLD AUTO: 4.91 X10(3) UL (ref 1.5–7.7)
NEUTROPHILS NFR BLD AUTO: 77.3 %
OSMOLALITY SERPL CALC.SUM OF ELEC: 304 MOSM/KG (ref 275–295)
PLATELET # BLD AUTO: 230 10(3)UL (ref 150–450)
POTASSIUM SERPL-SCNC: 4.3 MMOL/L (ref 3.5–5.1)
RBC # BLD AUTO: 5.44 X10(6)UL
SODIUM SERPL-SCNC: 141 MMOL/L (ref 136–145)
TROPONIN I SERPL HS-MCNC: 47 NG/L
WBC # BLD AUTO: 6.4 X10(3) UL (ref 4–11)

## 2025-01-17 PROCEDURE — 99233 SBSQ HOSP IP/OBS HIGH 50: CPT | Performed by: FAMILY MEDICINE

## 2025-01-17 PROCEDURE — 99233 SBSQ HOSP IP/OBS HIGH 50: CPT | Performed by: INTERNAL MEDICINE

## 2025-01-17 PROCEDURE — 93306 TTE W/DOPPLER COMPLETE: CPT | Performed by: HOSPITALIST

## 2025-01-17 RX ORDER — METOPROLOL SUCCINATE 50 MG/1
50 TABLET, EXTENDED RELEASE ORAL
Status: DISCONTINUED | OUTPATIENT
Start: 2025-01-18 | End: 2025-01-17

## 2025-01-17 RX ORDER — LOSARTAN POTASSIUM 50 MG/1
50 TABLET ORAL DAILY
Status: DISCONTINUED | OUTPATIENT
Start: 2025-01-18 | End: 2025-01-17

## 2025-01-17 NOTE — SIGNIFICANT EVENT
Risks/benefits/alternatives discussed with patient as applicable to reason for leaving AMA? Yes  Provider(s) contacted: Dr. Yun cai, SHIN Saxena  Patient education/AVS/follow-up appointments/prescriptions given? yes  AMA paperwork completed? yes  Removed IV access/decannulated port if applicable and patient belongings returned.    Documented from Admission Navigator:  Belongings Sent to Public Safety: None  Medications brought by patient?: No

## 2025-01-17 NOTE — PLAN OF CARE
Problem: CARDIOVASCULAR - ADULT  Goal: Maintains optimal cardiac output and hemodynamic stability  Description: INTERVENTIONS:  - Monitor vital signs, rhythm, and trends  - Monitor for bleeding, hypotension and signs of decreased cardiac output  - Evaluate effectiveness of vasoactive medications to optimize hemodynamic stability  - Monitor arterial and/or venous puncture sites for bleeding and/or hematoma  - Assess quality of pulses, skin color and temperature  - Assess for signs of decreased coronary artery perfusion - ex. Angina  - Evaluate fluid balance, assess for edema, trend weights  Outcome: Progressing  Goal: Absence of cardiac arrhythmias or at baseline  Description: INTERVENTIONS:  - Continuous cardiac monitoring, monitor vital signs, obtain 12 lead EKG if indicated  - Evaluate effectiveness of antiarrhythmic and heart rate control medications as ordered  - Initiate emergency measures for life threatening arrhythmias  - Monitor electrolytes and administer replacement therapy as ordered  Outcome: Progressing     Problem: RESPIRATORY - ADULT  Goal: Achieves optimal ventilation and oxygenation  Description: INTERVENTIONS:  - Assess for changes in respiratory status  - Assess for changes in mentation and behavior  - Position to facilitate oxygenation and minimize respiratory effort  - Oxygen supplementation based on oxygen saturation or ABGs  - Provide Smoking Cessation handout, if applicable  - Encourage broncho-pulmonary hygiene including cough, deep breathe, Incentive Spirometry  - Assess the need for suctioning and perform as needed  - Assess and instruct to report SOB or any respiratory difficulty  - Respiratory Therapy support as indicated  - Manage/alleviate anxiety  - Monitor for signs/symptoms of CO2 retention  Outcome: Progressing     Problem: METABOLIC/FLUID AND ELECTROLYTES - ADULT  Goal: Glucose maintained within prescribed range  Description: INTERVENTIONS:  - Monitor Blood Glucose as  ordered  - Assess for signs and symptoms of hyperglycemia and hypoglycemia  - Administer ordered medications to maintain glucose within target range  - Assess barriers to adequate nutritional intake and initiate nutrition consult as needed  - Instruct patient on self management of diabetes  Outcome: Progressing   HR controlled overnight.

## 2025-01-17 NOTE — PROGRESS NOTES
Progress Note     Renny Freitas Patient Status:  Inpatient    1980 MRN [id number]   Location [facility] 3W/SW Attending Renetta Malcolm MD   Hosp Day # 1 PCP None Pcp     Chief Complaint: patient presented with   Chief Complaint   Patient presents with    Shortness Of Breath       Subjective:   S: Patient denies any complaints     Review of Systems:   10 point ROS completed and was negative, except for pertinent positive and negatives stated in subjective.    Objective:   Vital signs:  Temp:  [98.2 °F (36.8 °C)-98.6 °F (37 °C)] 98.6 °F (37 °C)  Pulse:  [] 96  Resp:  [20-25] 20  BP: (134-165)/() 141/102  SpO2:  [90 %-95 %] 91 %    Wt Readings from Last 6 Encounters:   25 (!) 320 lb 8 oz (145.4 kg)   17 280 lb (127 kg)   09/21/15 300 lb (136.1 kg)         Physical Exam:    General: No acute distress. Alert ,      , morbidly obese  Respiratory: Clear to auscultation bilaterally. No wheezes. No rhonchi.  Cardiovascular: S1, S2. Regular rate and rhythm. No murmurs, rubs or gallops.   Abdomen: Soft, nontender, nondistended.  Positive bowel sounds. No rebound or guarding.  Neurologic: No focal neurological deficits.   Musculoskeletal: Moves all extremities.  Extremities: No edema.    Results:   Diagnostic Data:      Labs:    Labs Last 24 Hours:   BMP     CBC    Other     Na 141 Cl 105 BUN 13 Glu 310   Hb 15.5   PTT - Procal -   K 4.3 CO2 30.0 Cr 0.98   WBC 6.4 >< .0  INR - CRP -   Renal Lytes Endo    Hct 47.6   Trop - D dim -   eGFR - Ca 9.3 POC Gluc  295    LFT   pBNP - Lactic -   eGFR AA - PO4 - A1c 11.3   AST - APk - Prot -  LDL -     Mg - TSH -   ALT - T kevin - Alb -        COVID-19 Lab Results    COVID-19  Lab Results   Component Value Date    COVID19 Not Detected 2025       Pro-Calcitonin  No results for input(s): \"PCT\" in the last 168 hours.    Cardiac  Recent Labs   Lab 25  1357   PBNP 427*       Creatinine Kinase  No results for input(s): \"CK\" in the last  168 hours.    Inflammatory Markers  Recent Labs   Lab 01/16/25  1357   DDIMER 1.25*       Imaging: Imaging data reviewed in Epic.    Medications:    enoxaparin  40 mg Subcutaneous Daily    insulin aspart  1-11 Units Subcutaneous TID CC and HS    insulin degludec  15 Units Subcutaneous Nightly    methylPREDNISolone  40 mg Intravenous Q12H    aspirin  325 mg Oral Daily    oseltamivir  75 mg Oral BID    ipratropium-albuterol  3 mL Nebulization 4 times per day    fluticasone-salmeterol  1 puff Inhalation BID    cefTRIAXone  2 g Intravenous Q24H    doxycycline  100 mg Intravenous Q12H    dilTIAZem  60 mg Oral 4 times per day       Assessment & Plan:   ASSESSMENT / PLAN:     Problem List Items Addressed This Visit          HCC Problems    * (Principal) Acute respiratory failure with hypoxia (HCC) - Primary    Relevant Medications    ipratropium-albuterol (Duoneb) 0.5-2.5 (3) MG/3ML inhalation solution 3 mL (Completed)    albuterol (Ventolin) (2.5 MG/3ML) 0.083% nebulizer solution 2.5 mg (Completed)    methylPREDNISolone sodium succinate (Solu-MEDROL) injection 125 mg (Completed)    guaiFENesin (Robitussin) 100 MG/5 ML oral liquid 200 mg    benzonatate (Tessalon) cap 200 mg    ipratropium-albuterol (Duoneb) 0.5-2.5 (3) MG/3ML inhalation solution 3 mL    methylPREDNISolone sodium succinate (Solu-MEDROL) injection 40 mg    ipratropium-albuterol (Duoneb) 0.5-2.5 (3) MG/3ML inhalation solution 3 mL    fluticasone-salmeterol (Advair Diskus) 250-50 MCG/ACT inhaler 1 puff    doxycycline hyclate (Vibramycin) 100 mg in sodium chloride 0.9% 100 mL IVPB       Cardiac and Vasculature    Accelerated hypertension    Relevant Medications    metoprolol (Lopressor) 5 mg/5mL injection 2.5 mg (Completed)    hydrALAzine (Apresoline) 20 mg/mL injection 10 mg (Completed)    nitroglycerin (Nitrostat) SL tab 0.4 mg    hydrALAzine (Apresoline) 20 mg/mL injection 20 mg    dilTIAZem (cardIZEM) tab 60 mg    Elevated troponin    Relevant Medications     metoprolol (Lopressor) 5 mg/5mL injection 2.5 mg (Completed)    hydrALAzine (Apresoline) 20 mg/mL injection 10 mg (Completed)    enoxaparin (Lovenox) 40 MG/0.4ML SUBQ injection 40 mg    aspirin tab 325 mg    nitroglycerin (Nitrostat) SL tab 0.4 mg    hydrALAzine (Apresoline) 20 mg/mL injection 20 mg    dilTIAZem (cardIZEM) tab 60 mg       Patient is a 44-year-old  male who came into the emergency department for evaluation today for progressive wheezing, shortness of breath, fatigue, and dyspnea on exertion     Acute Hypoxic resp failure   -Influenza A positive   -On 2 L oxygen  -Pulmonary following  -Symptomatic mx  -Pulmonary followin  -nebs/Tamiflu/steroids/Abx    Sob/SVT /Elevated proBNP/mildly elevatd troponin  -ECHO-EF 44%     HTN  -Uncontrolled   -On PO Cardizem     New onset DM  -HBA1c  -Plan to start metformin on dc   -While inpt-cont SSI     Morbid obesity  -Sleep apnea  -Tobacco abuse  -Advised quit smoking   -CPAP      Quality:  DVT Prophylaxis: Lovenox   CODE status: FULL   DISPO: pending clinical improvement.   Estimated date of discharge: To be determined  Discharge is dependent on: Improved clinical status  At this point Patient is expected to be discharge to: Home versus rehab      Plan of care discussed with Patient and RN.     Coordinated care with providers and counseling re: treatment plan and workup     Renetta Malcolm MD    Supplementary Documentation:         **Certification      PHYSICIAN Certification of Need for Inpatient Hospitalization - Initial Certification    Patient will require inpatient services that will reasonably be expected to span two midnight's based on the clinical documentation in H+P.   Based on patients current state of illness, I anticipate that, after discharge, patient will require TBD.             I personally reviewed the available laboratories, imaging including operative report. I discussed/will discuss the case with patient and her nurse. I  ordered laboratories studies. I adjusted medications including not applicable today. Medical decision making high, risk is high.     >55min spent, >50% spent counseling and coordinating care in the form of educating pt/family and d/w consultants and staff. Most of the time spent discussing the above plan.

## 2025-01-17 NOTE — PROGRESS NOTES
Pulmonary Medicine Inpatient Progress Note           Consult requested by Dr. Elizondo for shortness of breath.        Subjective:  Feels better  Not wheezing as much as before  afebrile    From the initial consultation:  HPI: 43 yo male with hx of HTN (used to take meds in the past but not recently), previously smoked weed , admitted with shortness of breath, coughing, wheezing x 1 week. No n/v/d.  Possible sick contacts  In the ED found to be hypoxemic  Received neb tx, steroids  D dimer + so had a chest CT PE done showed pulm edema pattern  Tested influenza A +  proBNP 400    Afebrile, on 3 LNC. Feels a little better       REVIEW OF SYSTEMS:  Positives and negatives as stated in HPI. Remainder of 12 pt review of systems otherwise are negative.       PAST MEDICAL HISTORY:  Past Medical History:   Diagnosis Date    Unspecified essential hypertension         PAST SURGICAL HISTORY:  History reviewed. No pertinent surgical history.     PAST FAMILY HISTORY:  No family history on file.     PAST SOCIAL HISTORY:  Social History     Socioeconomic History    Marital status: Single   Tobacco Use    Smoking status: Never   Vaping Use    Vaping status: Never Used   Substance and Sexual Activity    Alcohol use: No     Alcohol/week: 0.0 standard drinks of alcohol    Drug use: Not Currently     Types: Cannabis     Comment: Quit 2 weeks ago    Sexual activity: Yes     Partners: Female   States he quit smoking 2 weeks ago. Before that has been smoking since age 16 @ 4-5 bag weed  Doesn't smoke cig  Lives with room mate  No pets  Employed: security        ALLERGIES:  Allergies[1]     MEDS:  Home Medications:  Medications Taking[2]  Scheduled Medication:   [START ON 1/18/2025] losartan  50 mg Oral Daily    [START ON 1/18/2025] metoprolol succinate ER  50 mg Oral Daily Beta Blocker    enoxaparin  40 mg Subcutaneous Daily    insulin aspart  1-11 Units Subcutaneous TID CC and HS    insulin degludec  15 Units Subcutaneous Nightly     methylPREDNISolone  40 mg Intravenous Q12H    aspirin  325 mg Oral Daily    oseltamivir  75 mg Oral BID    ipratropium-albuterol  3 mL Nebulization 4 times per day    fluticasone-salmeterol  1 puff Inhalation BID    cefTRIAXone  2 g Intravenous Q24H    doxycycline  100 mg Intravenous Q12H    dilTIAZem  60 mg Oral 4 times per day     Continuous Infusing Medication:    PRN Medications:    acetaminophen    ondansetron    prochlorperazine    temazepam    guaiFENesin    benzonatate    glucose **OR** glucose **OR** glucose-vitamin C **OR** dextrose **OR** glucose **OR** glucose **OR** glucose-vitamin C    ipratropium-albuterol    nitroglycerin    hydrALAzine       PHYSICAL EXAM:  BP (!) 126/94 (BP Location: Right arm)   Pulse 97   Temp 98.6 °F (37 °C) (Oral)   Resp 20   Ht 6' (1.829 m)   Wt (!) 320 lb 8 oz (145.4 kg)   SpO2 93%   BMI 43.47 kg/m²   CONSTITUTIONAL: alert, oriented, no apparent distress  HEENT: atraumatic normocephalic  MOUTH: mucous membranes are moist. No OP exudates  NECK/THROAT: no JVD. Trachea midline. No obvious thyromegaly  LUNG: clear b/l no wheezing, crackles. Chest symmetric with respiratory motion  HEART: regular rate and rhythm, no obvious murmers or gallops note  ABD: soft non tender. + bowel sounds. No organomegaly noted  EXT: no clubbing, cyanosis, or edema noted. Pulses intact grossly  NEURO/MUSCULOSKELETAL: no gross deficits  SKIN: warm, dry. No obvious lesions noted  LYMPH: no obvious LAD       IMAGES:  Chest CT PE my read no PE, b/l ground glass opacities concerning for pulm edema  Official read:  No pulmonary embolus   Diffuse septal thickening with multiple ill-defined areas of ground-glass within the bilateral upper and lower lobe suggestive of moderate pulmonary edema.  Superimposed multifocal pneumonia felt less likely.   Hepatic steatosis       LABS:  Recent Labs   Lab 01/16/25  1357 01/17/25  0646   RBC 5.14 5.44   HGB 14.9 15.5   HCT 45.3 47.6   MCV 88.1 87.5   MCH 29.0 28.5    MCHC 32.9 32.6   RDW 13.2 13.2   NEPRELIM 3.44 4.91   WBC 6.1 6.4   .0 230.0       Recent Labs   Lab 01/16/25  1357 01/17/25  0645   * 310*   BUN 10 13   CREATSERUM 1.06 0.98   EGFRCR 88 97   CA 9.2 9.3    141   K 4.3 4.3    105   CO2 31.0 30.0          ASSESSMENT/PLAN:  Acute hypoxemic respiratory failure  -likely d/t influenza A viral infection/bronchitis  -continue tamiflu course  -continue duonebs q 6 hrs and  advair 9250/50) bid  -complete short course of abx  -switch steroids to prednisone and taper by 10 mg every 3 days  -wean supp 02 as tolerated     HTN  -started on meds    Smokes weed  -stopped 2 weeks ago    Snoring and elevated BMI  -probably has AMANDEEP  -will need outpt PSG    Proph:  -DVT: hep or lovenox       Thank you for the opportunity to care for Renny Banegas DO, MPH  Pulmonary Critical Care Medicine  [facility]        [1] No Known Allergies  [2]   No outpatient medications have been marked as taking for the 1/16/25 encounter (Hospital Encounter).

## 2025-01-17 NOTE — DIABETES ED
[facility]    Diabetes Education  Note    Renny Freitas Patient Status:  Inpatient   1980 MRN [id number]  Location [facility] 3W/SW Attending Renetta Malcolm MD  Hosp Day # 1 PCP None Pcp      Labs:    HgbA1C (%)   Date Value   2025 11.3 (H)         Reason for Visit: Met with patient for initial new onset diabetes education. Patient sitting at bed side,alert  and oriented receptive  to education. Introduced patient to pathophysiology of diabetes. explained hemoglobin A1c in detail. Discussed components to diabetes self management healthy eating, monitoring, medications, healthy coping and physical activity. Instructed patient on glucometer use with return demonstration. As per pulmonary, patient will be switched from IV steroids to oral today with possible discharge.        Education Provided:  How to test blood glucose using glucometer  Benefits of testing BG as directed - record results and report to MD  Hypoglycemia symptoms/treatment/prevention  Basic Diet Guidelines  Beginning carb counting - initial meal plan provided  Importance of good BG control to prevent short and long term complications  Importance of close follow up with PCP and medical team  Benefits of physical activity     Patient verbalized understanding and was receptive to information provided.      Recommendations:     Attend outpatient diabetes education          Leatha Fitzpatrick RN  Diabetes Educator  2025  3:24 PM

## 2025-01-17 NOTE — PLAN OF CARE
Problem: Patient Centered Care  Goal: Patient preferences are identified and integrated in the patient's plan of care  Description: Interventions:  - What would you like us to know as we care for you? Home alone  - Provide timely, complete, and accurate information to patient/family  - Incorporate patient and family knowledge, values, beliefs, and cultural backgrounds into the planning and delivery of care  - Encourage patient/family to participate in care and decision-making at the level they choose  - Honor patient and family perspectives and choices  1/17/2025 1136 by Tyra Wilson RN  Outcome: Progressing  1/17/2025 1131 by Tyra Wilson RN  Outcome: Progressing     Problem: Patient/Family Goals  Goal: Patient/Family Long Term Goal  Description: Patient's Long Term Goal: improve breathing    Interventions:  - monitor vitals and labs  -follow plan  - See additional Care Plan goals for specific interventions  1/17/2025 1136 by Tyra Wilson RN  Outcome: Progressing  1/17/2025 1131 by Tyra Wilson RN  Outcome: Progressing  Goal: Patient/Family Short Term Goal  Description: Patient's Short Term Goal: improve blood sugar    Interventions:   - DM education  -follow plan of care  - See additional Care Plan goals for specific interventions  1/17/2025 1136 by Tyra Wilson RN  Outcome: Progressing  1/17/2025 1131 by Tyra Wilson RN  Outcome: Progressing     Problem: CARDIOVASCULAR - ADULT  Goal: Maintains optimal cardiac output and hemodynamic stability  Description: INTERVENTIONS:  - Monitor vital signs, rhythm, and trends  - Monitor for bleeding, hypotension and signs of decreased cardiac output  - Evaluate effectiveness of vasoactive medications to optimize hemodynamic stability  - Monitor arterial and/or venous puncture sites for bleeding and/or hematoma  - Assess quality of pulses, skin color and temperature  - Assess for signs of decreased coronary artery perfusion - ex. Angina  - Evaluate  fluid balance, assess for edema, trend weights  1/17/2025 1136 by Tyra Wilson RN  Outcome: Progressing  1/17/2025 1131 by Tyra Wilson RN  Outcome: Progressing  Goal: Absence of cardiac arrhythmias or at baseline  Description: INTERVENTIONS:  - Continuous cardiac monitoring, monitor vital signs, obtain 12 lead EKG if indicated  - Evaluate effectiveness of antiarrhythmic and heart rate control medications as ordered  - Initiate emergency measures for life threatening arrhythmias  - Monitor electrolytes and administer replacement therapy as ordered  1/17/2025 1136 by Tyra Wilson RN  Outcome: Progressing  1/17/2025 1131 by Tyra Wilson RN  Outcome: Progressing     Problem: RESPIRATORY - ADULT  Goal: Achieves optimal ventilation and oxygenation  Description: INTERVENTIONS:  - Assess for changes in respiratory status  - Assess for changes in mentation and behavior  - Position to facilitate oxygenation and minimize respiratory effort  - Oxygen supplementation based on oxygen saturation or ABGs  - Provide Smoking Cessation handout, if applicable  - Encourage broncho-pulmonary hygiene including cough, deep breathe, Incentive Spirometry  - Assess the need for suctioning and perform as needed  - Assess and instruct to report SOB or any respiratory difficulty  - Respiratory Therapy support as indicated  - Manage/alleviate anxiety  - Monitor for signs/symptoms of CO2 retention  1/17/2025 1136 by Tyra Wilson RN  Outcome: Progressing  1/17/2025 1131 by Tyra Wilson RN  Outcome: Progressing     Problem: METABOLIC/FLUID AND ELECTROLYTES - ADULT  Goal: Glucose maintained within prescribed range  Description: INTERVENTIONS:  - Monitor Blood Glucose as ordered  - Assess for signs and symptoms of hyperglycemia and hypoglycemia  - Administer ordered medications to maintain glucose within target range  - Assess barriers to adequate nutritional intake and initiate nutrition consult as needed  - Instruct patient  on self management of diabetes  1/17/2025 1136 by Tyra Wilson, RN  Outcome: Progressing  1/17/2025 1131 by Tyra Wilson, RN  Outcome: Progressing

## 2025-01-17 NOTE — PROGRESS NOTES
Progress Note  Renny Freitas Patient Status:  Inpatient    1980 MRN [id number]   Location [facility] 3W/SW Attending Nia Matthews MD   Hosp Day # 1 PCP None Pcp     Subjective   Feeling better this morning. No cardiac complaints. Still on oxygen at 2L NC.       Objective:   BP (!) 141/91 (BP Location: Right arm)   Pulse 90   Temp 98.2 °F (36.8 °C) (Oral)   Resp 22   Ht 6' (1.829 m)   Wt (!) 320 lb 8 oz (145.4 kg)   SpO2 94%   BMI 43.47 kg/m²     Telemetry: sinus rhythm     Intake/Output:    Intake/Output Summary (Last 24 hours) at 2025 0616  Last data filed at 2025 0600  Gross per 24 hour   Intake 1600 ml   Output --   Net 1600 ml       Wt Readings from Last 3 Encounters:   25 (!) 320 lb 8 oz (145.4 kg)   17 280 lb (127 kg)   09/21/15 300 lb (136.1 kg)         Labs:  Recent Labs   Lab 25  1357   *   BUN 10   CREATSERUM 1.06   EGFRCR 88   CA 9.2      K 4.3      CO2 31.0     Recent Labs   Lab 25  1357   RBC 5.14   HGB 14.9   HCT 45.3   MCV 88.1   MCH 29.0   MCHC 32.9   RDW 13.2   NEPRELIM 3.44   WBC 6.1   .0         Recent Labs   Lab 25  1357 25  1550 25  0011   TROPHS 72* 76* 47         Review of Systems:     10 point review of systems completed and negative except as noted in HPI    Constitutional:No fevers, chills, fatigue or night sweats.  Respiratory: Denies cough, wheezing or shortness of breath .  CV: Denies chest pain, palpitations, orthopnea, PND or dizziness .  GI: No nausea, vomiting or diarrhea. No blood in stools.  Neurologic: No seizures, tremors, weakness or numbness.       Exam:     Physical Exam:  General: Alert and oriented x 3. No apparent distress.   HEENT: Normocephalic, neck supple, no thyromegaly or adenopathy.  Neck: No JVD, carotids 2+, no bruits.  Cardiac: Regular rate and rhythm. S1, S2 normal. No murmur, pericardial rub, S3, or extra cardiac sounds.  Lungs: Clear without wheezes, rales,  rhonchi or dullness.  Normal excursions and effort.  Abdomen: Soft, non-tender. BS-present.  Extremities: Without clubbing or cyanosis. No edema.    Neurologic: Alert and oriented, normal affect. No focal defects  Skin: Warm and dry.     Medications:     enoxaparin  40 mg Subcutaneous Daily    insulin aspart  1-11 Units Subcutaneous TID CC and HS    insulin degludec  15 Units Subcutaneous Nightly    methylPREDNISolone  40 mg Intravenous Q12H    aspirin  325 mg Oral Daily    oseltamivir  75 mg Oral BID    ipratropium-albuterol  3 mL Nebulization 4 times per day    fluticasone-salmeterol  1 puff Inhalation BID    cefTRIAXone  2 g Intravenous Q24H    doxycycline  100 mg Intravenous Q12H    dilTIAZem  60 mg Oral 4 times per day         Diagnostic Studies:        Telemetry: Sinus with paroxysmal brief SVT.     Chest x-ray with interstitial changes but no obvious congestion or pleural effusion.     Chest CT -no PE but diffuse septal thickening with multiple ill-defined areas of ground-glass within the bilateral upper and lower lobe -multifocal infectious process with the virus.  Fatty liver.     EKG: Tachycardia 102 bpm with PACs, no ischemia, normal intervals and normal axis.     Echo: September 2015 -per [facility]ology report - LVEF 55% with mild to moderate LVH, concentric hypertrophy but no wall motion abnormalities.  Mildly enlarged left atrium.  No valvular pathology and no pulm hypertension.    Assessment and Plan:     Assessment:  # Acute hypoxic respiratory failure secondary to viral syndrome with influenza A  D-dime +, CT negative for PE but suggestive of pulmonary edema   Neb tx, steroids per primary and pulm   Continue supportive care   #  Mild reactive tachycardia/ SVT triggered by above and nebulized treatment   Sinus rhythm in 90's on tele overnight   Continue PO Cardizem   Treat underlying etiologies   # Mildly elevated proBNP 427   Likely secondary to infection rather CHF   No chest pain or angina  equivalent   # Mildly elevated trop level 72 > 76 > 47   Likely secondary to viral infection given no chest pain or EKG changes   # Hypertension - borderline   Continue diltiazem for now   No previous diagnosis of HTN   # Morbid obesity with BMI 43  Sleep apnea study recommended   Aggressive weight loss advised   # h/o cigarette smoking   Complete cessation advised     Plan:  Rates improving; BP remain borderline; no previous dx of HTN   His symptoms mostly related to his viral infection with influenza   Mild trop and proBNP elevation likely secondary to viral syndrome given no active cardiac symptoms or over fluid oveload; echo still pending  Further recommendation pending echo findings      Plan of care discussed with patient, RN.    Nurys Fuentes, APRN  1/17/2025  6:16 AM  Ph [phone] ([place])  Ph [phone] ([place])      I saw and examined the patient agree with attached findings.  Overall feels better today however still short of breath with frequent cough.  Reviewed TTE with patient which noted EF of 44% and grade 3 diastolic dysfunction, PA systolic pressure of 51 mmHg.  Will give a dose of Lasix 40 mg IV once today.  Start losartan 50 mg daily and metoprolol succinate 50 mg daily, no wheezing on exam today.  Wean off oral diltiazem tomorrow.  Mildly reduced EF may be secondary to poorly controlled hypertension, diabetes, obesity with possible obstructive sleep apnea, however need to rule out obstructive disease given multiple risk factors.  Ideally would obtain coronary CTA which can be done early next week or even as outpatient.    Aba Hull MD  [place] [facility] [place]

## 2025-01-18 NOTE — DISCHARGE SUMMARY
[facility]  part of [facility]    Discharge Summary    Renny Freitas Patient Status:  Inpatient    1980 MRN [id number]   Location [facility] 3W/SW Attending No att. providers found   Hosp Day # 1 PCP None Pcp     Date of Admission: 2025 Disposition: Left Against Medical Advice     Date of Discharge: -> pt left AMA     Admitting Diagnosis: Accelerated hypertension [I10]  Elevated troponin [R79.89]  Acute respiratory failure with hypoxia (HCC) [J96.01]    Hospital Discharge Diagnoses:   Acute hypoxic Resp failure      Problem List:   Patient Active Problem List   Diagnosis    Acute pulmonary embolism (HCC)    Morbid obesity (HCC)    Pneumonia of right lung due to infectious organism, unspecified part of lung    Rectal bleeding    Snoring    Borderline blood pressure    Acute respiratory failure with hypoxia (HCC)    Accelerated hypertension    Elevated troponin    Acute bronchitis           Hospital Course: Patient is a 44-year-old  male who came into the emergency department for evaluation today for progressive wheezing, shortness of breath, fatigue, and dyspnea on exertion      Acute Hypoxic resp failure   -Influenza A positive   -On 2 L oxygen  -Pulmonary following  -Symptomatic mx  -Pulmonary followin  -nebs/Tamiflu/steroids/Abx     Sob/SVT /Elevated proBNP/mildly elevatd troponin  -ECHO-EF 44%      HTN  -Uncontrolled   -On PO Cardizem      New onset DM  -HBA1c  -Plan to start metformin on dc   -While inpt-cont SSI      Morbid obesity  -Sleep apnea  -Tobacco abuse  -Advised quit smoking   -CPAP      PT LEFT AMA On         Renetta Malcolm MD  2025  9:35 AM

## 2025-01-20 ENCOUNTER — PATIENT OUTREACH (OUTPATIENT)
Dept: CASE MANAGEMENT | Age: 45
End: 2025-01-20

## 2025-01-20 NOTE — PROGRESS NOTES
DM appointment request (discharged 01/17)    Primary Care Physician  [facility]  [address] [place] [address]  [phone]  Patient advised that he will be going to the clinic (it is a walk-in clinic)  Closing encounter

## (undated) NOTE — ED AVS SNAPSHOT
[facility] Emergency Department    [address]     [place] [address]    Phone:  [phone]    Fax:  [phone]           Jazmin Long   MRN: [id number]    Department:  [facility] Emergency Department   Date of Visit:  1/30/2 covered by your plan. Please contact your insurance company to determine coverage and benefits available for follow-up care and referrals.       If you have difficulty scheduling your follow-up appointment as directed, please call our  at ([phone]) If you believe that any of the medications or instructions on this list is different from what your Primary Care doctor has instructed you - please continue to take your medications as instructed by your Primary Care doctor until you can check with your do Medicaid plans. To get signed up and covered, please call [phone] and ask to get set up for an insurance coverage that is in-network with [facility]. Kenneth     Sign up for [email or web address], your secure online medical record.   [employer]

## (undated) NOTE — ED AVS SNAPSHOT
[facility] Emergency Department    [address]     [place] [address]    Phone:  [phone]    Fax:  [phone]           Clara [place]   MRN: [id number]    Department:  [facility] Emergency Department   Date of Visit:  1/30/2 and Class Registration line at [phone] or find a doctor online by visiting [email or web address].    IF THERE IS ANY CHANGE OR WORSENING OF YOUR CONDITION, CALL YOUR PRIMARY CARE PHYSICIAN AT ONCE OR RETURN IMMEDIATELY TO [address] [place] [address].     If